# Patient Record
Sex: MALE | Race: ASIAN | NOT HISPANIC OR LATINO
[De-identification: names, ages, dates, MRNs, and addresses within clinical notes are randomized per-mention and may not be internally consistent; named-entity substitution may affect disease eponyms.]

---
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
## 2018-01-19 ENCOUNTER — APPOINTMENT (OUTPATIENT)
Dept: PSYCHIATRY | Facility: CLINIC | Age: 18
End: 2018-01-19
Payer: COMMERCIAL

## 2018-01-19 PROCEDURE — 90837 PSYTX W PT 60 MINUTES: CPT

## 2018-02-23 ENCOUNTER — APPOINTMENT (OUTPATIENT)
Dept: PSYCHIATRY | Facility: CLINIC | Age: 18
End: 2018-02-23
Payer: COMMERCIAL

## 2018-02-23 PROCEDURE — 99214 OFFICE O/P EST MOD 30 MIN: CPT

## 2018-02-23 PROCEDURE — 90837 PSYTX W PT 60 MINUTES: CPT

## 2018-02-23 RX ORDER — TRIAMCINOLONE ACETONIDE 1 MG/G
0.1 OINTMENT TOPICAL
Qty: 80 | Refills: 0 | Status: DISCONTINUED | COMMUNITY
Start: 2018-01-17

## 2018-02-23 RX ORDER — ESCITALOPRAM OXALATE 10 MG/1
10 TABLET ORAL DAILY
Qty: 45 | Refills: 2 | Status: DISCONTINUED | COMMUNITY
Start: 2017-10-09 | End: 2018-02-23

## 2018-02-23 RX ORDER — FLUTICASONE PROPIONATE 0.5 MG/G
0.05 CREAM TOPICAL
Qty: 60 | Refills: 0 | Status: DISCONTINUED | COMMUNITY
Start: 2017-12-20

## 2018-06-20 ENCOUNTER — APPOINTMENT (OUTPATIENT)
Dept: PSYCHIATRY | Facility: CLINIC | Age: 18
End: 2018-06-20

## 2021-10-26 ENCOUNTER — APPOINTMENT (OUTPATIENT)
Dept: PSYCHIATRY | Facility: CLINIC | Age: 21
End: 2021-10-26
Payer: COMMERCIAL

## 2021-10-26 PROCEDURE — 99205 OFFICE O/P NEW HI 60 MIN: CPT

## 2022-01-24 ENCOUNTER — APPOINTMENT (OUTPATIENT)
Dept: PSYCHIATRY | Facility: CLINIC | Age: 22
End: 2022-01-24
Payer: COMMERCIAL

## 2022-01-24 PROCEDURE — 99214 OFFICE O/P EST MOD 30 MIN: CPT

## 2022-07-22 ENCOUNTER — APPOINTMENT (OUTPATIENT)
Dept: PSYCHIATRY | Facility: CLINIC | Age: 22
End: 2022-07-22

## 2022-07-22 PROCEDURE — 99214 OFFICE O/P EST MOD 30 MIN: CPT

## 2022-07-22 RX ORDER — GABAPENTIN 600 MG/1
600 TABLET, COATED ORAL
Qty: 90 | Refills: 1 | Status: DISCONTINUED | COMMUNITY
Start: 2021-10-26 | End: 2022-07-22

## 2023-01-20 ENCOUNTER — APPOINTMENT (OUTPATIENT)
Dept: PSYCHIATRY | Facility: CLINIC | Age: 23
End: 2023-01-20
Payer: COMMERCIAL

## 2023-01-20 DIAGNOSIS — F41.9 ANXIETY DISORDER, UNSPECIFIED: ICD-10-CM

## 2023-01-20 DIAGNOSIS — F33.9 MAJOR DEPRESSIVE DISORDER, RECURRENT, UNSPECIFIED: ICD-10-CM

## 2023-01-20 DIAGNOSIS — G47.00 INSOMNIA, UNSPECIFIED: ICD-10-CM

## 2023-01-20 PROCEDURE — 99214 OFFICE O/P EST MOD 30 MIN: CPT

## 2023-01-20 RX ORDER — TRAZODONE HYDROCHLORIDE 50 MG/1
50 TABLET ORAL
Qty: 180 | Refills: 1 | Status: ACTIVE | COMMUNITY
Start: 2022-07-22 | End: 1900-01-01

## 2023-01-20 NOTE — SOCIAL HISTORY
[With Family] : lives with family [Never ] : never  [High School] : high school [None] : none [FreeTextEntry2] : leave of absence from school [FreeTextEntry1] : Patient grew up in [place]. Elementary school was good he had a small group of friends he was a good student. He began to excel in the fifth grade. In middle school he had a good group of friends his grades were in the 90s. He graduated from [employer] in 2016. He was in the honor society his grade average was 96.7. In college she had only a roommate as a friend. His grade point average is 3.0.

## 2023-01-20 NOTE — FAMILY HISTORY
[FreeTextEntry1] : Patient born in the [facility]. Mother 49 works in a drug store. Father 60 as a microbiologist. He has a brother who suffers with depression and a sister. No other psychiatric alcohol and drug history noted. No abuse history growing up.

## 2023-01-20 NOTE — PAST MEDICAL HISTORY
[FreeTextEntry1] : See history of present illness. Patient has never been on any medication for depression. In the past he was in therapy.

## 2023-01-20 NOTE — DISCUSSION/SUMMARY
[FreeTextEntry1] : 23-year-old male with anxiety depression.  Plan trial of trazodone 50 to 100 mg at night.  Follow-up in 6 months.

## 2023-01-20 NOTE — PHYSICAL EXAM
[None] : none [Soft] : soft [Normal] : normal [Fair] : fair [Delayed] : not delayed [Dysphoric] : not dysphoric [FreeTextEntry8] : Subdued
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
## 2023-01-20 NOTE — CURRENT PSYCHIATRIC SYMPTOMS
[Insomnia] : insomnia [Depressed Mood] : no depressed mood [Anhedonia] : no anhedonia [Decreased Concentration] : no decrease in concentrating ability [Psychomotor Retardation] : no psychomotor retardation [Excessive Worry] : no excessive worries [Panic] : no panic disorder [de-identified] : denied [de-identified] : denied [de-identified] : denied [de-identified] : none [de-identified] : none [de-identified] : none

## 2023-06-22 ENCOUNTER — NON-APPOINTMENT (OUTPATIENT)
Age: 23
End: 2023-06-22

## 2023-06-23 ENCOUNTER — TRANSCRIPTION ENCOUNTER (OUTPATIENT)
Age: 23
End: 2023-06-23

## 2023-06-23 ENCOUNTER — APPOINTMENT (OUTPATIENT)
Dept: OTOLARYNGOLOGY | Facility: CLINIC | Age: 23
End: 2023-06-23
Payer: COMMERCIAL

## 2023-06-23 ENCOUNTER — NON-APPOINTMENT (OUTPATIENT)
Age: 23
End: 2023-06-23

## 2023-06-23 VITALS
WEIGHT: 155 LBS | DIASTOLIC BLOOD PRESSURE: 77 MMHG | HEIGHT: 66 IN | SYSTOLIC BLOOD PRESSURE: 112 MMHG | BODY MASS INDEX: 24.91 KG/M2 | HEART RATE: 69 BPM

## 2023-06-23 PROCEDURE — 99204 OFFICE O/P NEW MOD 45 MIN: CPT | Mod: 25

## 2023-06-23 PROCEDURE — 31231 NASAL ENDOSCOPY DX: CPT

## 2023-06-23 RX ORDER — AZELASTINE HYDROCHLORIDE AND FLUTICASONE PROPIONATE 137; 50 UG/1; UG/1
137-50 SPRAY, METERED NASAL
Qty: 1 | Refills: 5 | Status: ACTIVE | COMMUNITY
Start: 2023-06-23 | End: 1900-01-01

## 2023-06-23 NOTE — ASSESSMENT
[FreeTextEntry1] : Reviewed and reconciled medications, allergies, PMHx, PSHx, SocHx, FMHx \par \par Patient presents complaining that the last 6 months he has had constant post nasal drip and nasal congestion. leads to sore throat and hoarseness. Tried flonase and mucinex, no help. N headaches or facial pain. no change in environment or new pets. No change in medication.  Blows nose and has clear mucous. Denies smoking. Denies drinking. Patient has had allergy testing done for eczema - allergy to pollen, diary, and shellfish. States difficulty breathing when laying down to go to bed.\par \par Physical Exam:\par -deviated septum bilaterally along the floor and mid portion on the left\par -swollen turbinates bilaterally\par -type 1 oral cavity\par -tonsils class 1\par -tongue tie\par -Uvula small and normal\par \par Flexible nasal Endoscopy:\par Left Side:\par -septum up against inferior turbinate\par -middle turbinate slightly enlarged\par Right Side:\par -middle meatus wide open and clear\par -septum against inferior turbinate\par -middle turbinate enlarged\par \par Plan: Flexible nasal Endoscopy. Start Dymista - 1 spray bilaterally BID, spray laterally. If sprays don’t work we will get a CT scan and discuss surgery. R/B/A of surgery discussed. FU 4-6 weeks

## 2023-06-23 NOTE — CONSULT LETTER
[Dear  ___] : Dear  [unfilled], [Courtesy Letter:] : I had the pleasure of seeing your patient, [unfilled], in my office today. [Please see my note below.] : Please see my note below. [Consult Closing:] : Thank you very much for allowing me to participate in the care of this patient.  If you have any questions, please do not hesitate to contact me. [Sincerely,] : Sincerely, [FreeTextEntry1] : Esau Arambula MD FACS

## 2023-06-23 NOTE — PROCEDURE
[Recalcitrant Symptoms] : recalcitrant symptoms  [FreeTextEntry6] : Flexible nasal Endoscopy:\par Left Side:\par -septum up against inferior turbinate\par -middle turbinate slightly enlarged\par Right Side:\par -middle meatus wide open and clear\par -septum against inferior turbinate\par -middle turbinate enlarged

## 2023-06-23 NOTE — HISTORY OF PRESENT ILLNESS
[de-identified] : Patient presents complaining that the last 6 months he has had constant post nasal drip and nasal congestion. leads to sore throat and hoarseness. Tried flonase and mucinex, no help. N headaches or facial pain. no change in environment or new pets. No change in medication.  Blows nose and has clear mucous. Denies smoking. Denies drinking. Patient has had allergy testing done for eczema - allergy to pollen, diary, and shellfish. States difficulty breathing when laying down to go to bed.

## 2023-06-23 NOTE — REVIEW OF SYSTEMS
[Nasal Congestion] : nasal congestion [Hoarseness] : hoarseness [Throat Clearing] : throat clearing [Throat Dryness] : throat dryness [Throat Itching] : throat itching [Negative] : Heme/Lymph

## 2023-06-23 NOTE — PHYSICAL EXAM
[Hearing Gonzalez Test (Tuning Fork On Forehead)] : no lateralization of tone [Midline] : trachea located in midline position [Normal] : no rashes [FreeTextEntry1] : -deviated septum bilaterally along the floor and mid portion on the left\par -swollen turbinates bilaterally [de-identified] : tongue tie. Uvula small and normal [de-identified] : class 1 [de-identified] : type 1 oral cavity [FreeTextEntry2] : sensations intact. sinuses nontender to percussion

## 2023-07-14 ENCOUNTER — APPOINTMENT (OUTPATIENT)
Dept: PSYCHIATRY | Facility: CLINIC | Age: 23
End: 2023-07-14

## 2023-07-28 ENCOUNTER — APPOINTMENT (OUTPATIENT)
Dept: OTOLARYNGOLOGY | Facility: CLINIC | Age: 23
End: 2023-07-28
Payer: COMMERCIAL

## 2023-07-28 VITALS — BODY MASS INDEX: 25.71 KG/M2 | HEIGHT: 66 IN | WEIGHT: 160 LBS

## 2023-07-28 DIAGNOSIS — R09.81 NASAL CONGESTION: ICD-10-CM

## 2023-07-28 DIAGNOSIS — J34.3 HYPERTROPHY OF NASAL TURBINATES: ICD-10-CM

## 2023-07-28 DIAGNOSIS — J31.0 CHRONIC RHINITIS: ICD-10-CM

## 2023-07-28 DIAGNOSIS — J34.89 OTHER SPECIFIED DISORDERS OF NOSE AND NASAL SINUSES: ICD-10-CM

## 2023-07-28 DIAGNOSIS — J34.2 DEVIATED NASAL SEPTUM: ICD-10-CM

## 2023-07-28 DIAGNOSIS — J34.89 NASAL CONGESTION: ICD-10-CM

## 2023-07-28 PROCEDURE — 99213 OFFICE O/P EST LOW 20 MIN: CPT

## 2023-07-28 NOTE — HISTORY OF PRESENT ILLNESS
[de-identified] : Patient w/ history of deviated septum, nasal obstruction, hypertrophy of bilat inferior nasal turbinates, and nasal congestion/rhinorrhea. Presents to day for follow up. At last visit we prescribed dymista, which the patient has been taking as prescribed. Feels the medication is helping to alleviate his sx.

## 2023-07-28 NOTE — PHYSICAL EXAM
[Midline] : trachea located in midline position [Normal] : no rashes [FreeTextEntry1] : sensation intact [Hearing Loss Right Only] : normal [Hearing Loss Left Only] : normal [de-identified] : bilat turbinate hypertrophy [de-identified] : class 1  [de-identified] : type 2 oral cavity

## 2023-07-28 NOTE — ASSESSMENT
[FreeTextEntry1] : Reviewed and reconciled medication, allergies, PMHx, PSHx, SocHx, FMHx. \par \par Patient w/ history of deviated septum, nasal obstruction, hypertrophy of bilat inferior nasal turbinates, and nasal congestion/rhinorrhea. Presents to day for follow up. At last visit we prescribed dymista, which the patient has been taking as prescribed. Feels the medication is helping to alleviate his sx. \par \par Physical Exam:\par -bilateral turbinate hypertrophy\par \par Plan: Continue with medication as prescribed, follow up in 6 months.